# Patient Record
Sex: FEMALE | Race: WHITE | NOT HISPANIC OR LATINO | Employment: OTHER | URBAN - METROPOLITAN AREA
[De-identification: names, ages, dates, MRNs, and addresses within clinical notes are randomized per-mention and may not be internally consistent; named-entity substitution may affect disease eponyms.]

---

## 2023-10-30 ENCOUNTER — HOSPITAL ENCOUNTER (EMERGENCY)
Facility: MEDICAL CENTER | Age: 78
End: 2023-10-30
Attending: EMERGENCY MEDICINE
Payer: MEDICARE

## 2023-10-30 VITALS
DIASTOLIC BLOOD PRESSURE: 63 MMHG | WEIGHT: 135 LBS | RESPIRATION RATE: 15 BRPM | OXYGEN SATURATION: 97 % | TEMPERATURE: 97.2 F | HEART RATE: 83 BPM | SYSTOLIC BLOOD PRESSURE: 114 MMHG

## 2023-10-30 DIAGNOSIS — R04.0 EPISTAXIS: ICD-10-CM

## 2023-10-30 PROCEDURE — 700101 HCHG RX REV CODE 250

## 2023-10-30 PROCEDURE — 700102 HCHG RX REV CODE 250 W/ 637 OVERRIDE(OP): Performed by: EMERGENCY MEDICINE

## 2023-10-30 PROCEDURE — 303620 HCHG EPISTAXIS CONTROL

## 2023-10-30 PROCEDURE — A9270 NON-COVERED ITEM OR SERVICE: HCPCS | Performed by: EMERGENCY MEDICINE

## 2023-10-30 PROCEDURE — 99284 EMERGENCY DEPT VISIT MOD MDM: CPT

## 2023-10-30 RX ADMIN — PHENYLEPHRINE HYDROCHLORIDE 2 SPRAY: 0.25 SPRAY NASAL at 10:30

## 2023-10-30 RX ADMIN — SILVER NITRATE 2 APPLICATOR: 38.21; 12.74 STICK TOPICAL at 11:00

## 2023-10-30 NOTE — ED PROVIDER NOTES
ED Provider Note    CHIEF COMPLAINT  Chief Complaint   Patient presents with    Epistaxis     0745 taking Plavix and asa        EXTERNAL RECORDS REVIEWED  Reviewed the patient's chart for any old records or labs and none were available.  [place]    HPI/ROS  LIMITATION TO HISTORY   Select: : None  OUTSIDE HISTORIAN(S):  Family patient's granddaughter brings her in stating that she has been bleeding since 745 this morning    Arelis Clarke is a 78 y.o. female who presents complaining of a bloody nose that she has had since 7:45 this morning.  She states it was going down the back of her throat and coming out of both nostrils.  She feels somewhat weak but denies any chest pains or shortness of breath.  She has not had any fevers chills cough or cold symptoms.  Does take aspirin and Plavix because of her history of coronary artery disease.  Denies any coughing nausea or vomiting.      PAST MEDICAL HISTORY   has a past medical history of CAD (coronary artery disease), Cancer (HCC), Hypertension, and Renal disorder.    SURGICAL HISTORY   has a past surgical history that includes other cardiac surgery.    FAMILY HISTORY  History reviewed. No pertinent family history.    SOCIAL HISTORY  Social History     Tobacco Use    Smoking status: Former     Types: Cigarettes    Smokeless tobacco: Never   Vaping Use    Vaping Use: Never used   Substance and Sexual Activity    Alcohol use: Yes     Comment: 1-2  day    Drug use: Never    Sexual activity: Not on file       CURRENT MEDICATIONS  Home Medications       Reviewed by Lena Caldera R.N. (Registered Nurse) on 10/30/23 at 0948  Med List Status: Not Addressed     Medication Last Dose Status        Patient Neil Taking any Medications                           ALLERGIES  Allergies   Allergen Reactions    Morphine     Sulfacetamide        PHYSICAL EXAM  VITAL SIGNS: /85   Pulse (!) 110   Temp 36.1 °C (97 °F) (Temporal)    Resp 18   Wt 61.2 kg (135 lb)   SpO2 97%      Constitutional: Well developed, Well nourished, No acute distress, Non-toxic appearance.   HEENT: Normocephalic, Atraumatic,  external ears normal, pharynx pink,  Mucous  Membranes moist, No rhinorrhea or mucosal edema patient has clotted blood in both nares with no active bleeding, she has no blood down the back of her throat.  Eyes: PERRL, EOMI, Conjunctiva normal, No discharge.   Neck: Normal range of motion, No tenderness, Supple, No stridor.   Lymphatic: No lymphadenopathy    Cardiovascular: Regular Rate and Rhythm, No murmurs,  rubs, or gallops.   Thorax & Lungs: Lungs clear to auscultation bilaterally, No respiratory distress, No wheezes, rhales or rhonchi, No chest wall tenderness.    Skin: Warm, Dry, No erythema, No rash,   Back:  No CVA tenderness,  No spinal tenderness, bony crepitance step offs or instability.   Extremities: Equal, intact distal pulses, No cyanosis, clubbing or edema,  No tenderness.   Neurologic: Alert & oriented No focal deficits noted.  Psychiatric: Affect normal, Judgment normal, Mood normal.      DIAGNOSTIC STUDIES / PROCEDURES  EKG  I have independently interpreted this EKG  none    LABS   none  RADIOLOGY  I have independently interpreted the diagnostic imaging associated with this visit and am waiting the final reading from the radiologist.   My preliminary interpretation is as follows: none  Radiologist interpretation: none    COURSE & MEDICAL DECISION MAKING    ED Observation Status? no patient does not meet criteria for observation    INITIAL ASSESSMENT, COURSE AND PLAN  Care Narrative: This is a 78-year-old female who currently takes Plavix and aspirin daily for coronary artery disease who started having a bloody nose at 745 this morning.  She states it was coming out of both nostrils and going down the back of her throat.  On physical exam here she has a nasal clip on.  With examination she has clotted blood in both nares and no  posterior pharyngeal draining and no active bleeding at this time.     Differential diagnosis: anterior versus posterior epistaxis  ADDITIONAL PROBLEM LIST    Hypertension  Coronary artery disease on Plavix and aspirin  High cholesterol      DISPOSITION AND DISCUSSIONS    I placed Franklyn-Synephrine soaked cotton balls into the patient's nares bilaterally.  These were left in for 15 minutes.  After he removed the cotton balls I evaluated for any sources of bleeding in the anterior septum.  The patient had two areas of clotted bleeding in the anterior septum, one in each nares and I was able to cauterize these areas with silver nitrate.    I ambulated the patient around the ER and she had no further bleeding.  She will be discharged home in the care of her family.  I advised her to stop using the aspirin and Plavix for the next 3 days use Ocean spray nasal spray 2 moisturize her nostrils and avoid any hot liquids hot showers or any heat that would increase the blood flow to her nose and face.  The patient is advised not to blow her nose for the next week.  If she has worsening bleeding dizziness or lightheadedness she should return to the emergency department immediately.    I have discussed management of the patient with the following physicians and TAMMI's:  none    Discussion of management with other QHP or appropriate source(s): None     Escalation of care considered, and ultimately not performed:blood analysis but the patient's vital signs are stable and she is not actively bleeding does not appear anemic on exam    Barriers to care at this time, including but not limited to: Patient does not have established PCP in [place]    Decision tools and prescription drugs considered including, but not limited to:  none .  The patient will return for new or worsening symptoms and is stable at the time of discharge.    The patient is referred to a primary physician for blood pressure management, diabetic screening, and for all  other preventative health concerns.      DISPOSITION:  Patient will be discharged home in stable condition.    FOLLOW UP:  [facility], Emergency Dept  [address] [address] [phone]    As needed, If symptoms worsen      OUTPATIENT MEDICATIONS:  There are no discharge medications for this patient.      FINAL DIAGNOSIS  1. Epistaxis           Electronically signed by: Bailey Swain M.D., 10/30/2023 9:54 AM

## 2023-10-30 NOTE — ED NOTES
Discharge instructions reviewed with patient and signed.They verbalized understanding of follow up instructions. All belongings with patient. They ambulate with a steady gait

## 2023-10-30 NOTE — ED TRIAGE NOTES
.  Chief Complaint   Patient presents with    Epistaxis     0745 taking Plavix and asa      Pt visiting family from [place]. Onset nose bleed this am bilateral. Nose clamp placed on arrival.